# Patient Record
(demographics unavailable — no encounter records)

---

## 2025-03-13 NOTE — VITALS
[60: Requires occasional assistance, but is able to care for most of his/her needs] : 60: Requires occasional assistance, but is able to care for most of his/her needs [ECOG Performance Status: 2 - Ambulatory and capable of all self care but unable to carry out any work activities] : Performance Status: 2 - Ambulatory and capable of all self care but unable to carry out any work activities. Up and about more than 50% of waking hours

## 2025-04-02 NOTE — PHYSICAL EXAM
[General Appearance - Alert] : alert [General Appearance - In No Acute Distress] : in no acute distress [Sclera] : the sclera and conjunctiva were normal [Extraocular Movements] : extraocular movements were intact [Outer Ear] : the ears and nose were normal in appearance [Hearing Threshold Finger Rub Not [place]] : hearing was normal [Examination Of The Oral Cavity] : the lips and gums were normal [] : no respiratory distress [Exaggerated Use Of Accessory Muscles For Inspiration] : no accessory muscle use [Skin Color & Pigmentation] : normal skin color and pigmentation [No Focal Deficits] : no focal deficits [Oriented To Time, Place, And Person] : oriented to person, place, and time [de-identified] : macroglossia, slightly deviated tongue

## 2025-04-02 NOTE — REVIEW OF SYSTEMS
[Nausea: Grade 0] : Nausea: Grade 0 [Vomiting: Grade 0] : Vomiting: Grade 0 [Blurred Vision: Grade 1 - Intervention not indicated] : Blurred Vision: Grade 1 - Intervention not indicated [Cognitive Disturbance: Grade 2 - Moderate cognitive disability; interfering with work/school/life performance but capable of independent living; specialized resources on part time basis indicated] : Cognitive Disturbance: Grade 2 - Moderate cognitive disability; interfering with work/school/life performance but capable of independent living; specialized resources on part time basis indicated [Dizziness: Grade 1 - Mild unsteadiness or sensation of movement] : Dizziness: Grade 1 - Mild unsteadiness or sensation of movement [Headache: Grade 1 - Mild pain] : Headache: Grade 1 - Mild pain [Peripheral Motor Neuropathy: Grade 1 - Asymptomatic; clinical or diagnostic observations only; intervention not indicated] : Peripheral Motor Neuropathy: Grade 1 - Asymptomatic; clinical or diagnostic observations only; intervention not indicated [Peripheral Sensory Neuropathy: Grade 1 - Asymptomatic; loss of deep tendon reflexes or paresthesia] : Peripheral Sensory Neuropathy: Grade 1 - Asymptomatic; loss of deep tendon reflexes or paresthesia [Hoarseness: Grade 1 - Mild or intermittent voice change; fully understandable; self-resolves] : Hoarseness: Grade 1 - Mild or intermittent voice change; fully understandable; self-resolves [Voice Alteration: Grade 1 - Mild or intermittent change from normal voice] : Voice Alteration: Grade 1 - Mild or intermittent change from normal voice [Pruritus: Grade 0] : Pruritus: Grade 0 [Negative] : Musculoskeletal [FreeTextEntry3] : vision is worsening [de-identified] : memory worsening [de-identified] : both eyes

## 2025-04-02 NOTE — HISTORY OF PRESENT ILLNESS
[FreeTextEntry1] : Mrs. 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  Followed by Dr. Reina   3/18/2025- Mr. Ding presents today for follow up.  MRI 3/19/2025 showed: Stable right jugular fossa enhancing lesion compatible with known glomus jugulare. No evidence of acute ischemia or intracranial hemorrhage. Mild to moderate chronic white matter ischemic changes. Today she notes her memory is not as good as it used to be. Feels her vision is worsening and her tongue deviation is worsening.  She has been referred for Amyvid PET, for neuropsych testing, and to ENT.

## 2025-04-02 NOTE — PHYSICAL EXAM
[General Appearance - Alert] : alert [General Appearance - In No Acute Distress] : in no acute distress [Sclera] : the sclera and conjunctiva were normal [Extraocular Movements] : extraocular movements were intact [Outer Ear] : the ears and nose were normal in appearance [Hearing Threshold Finger Rub Not [place]] : hearing was normal [Examination Of The Oral Cavity] : the lips and gums were normal [] : no respiratory distress [Exaggerated Use Of Accessory Muscles For Inspiration] : no accessory muscle use [Skin Color & Pigmentation] : normal skin color and pigmentation [No Focal Deficits] : no focal deficits [Oriented To Time, Place, And Person] : oriented to person, place, and time [de-identified] : macroglossia, slightly deviated tongue

## 2025-04-02 NOTE — REVIEW OF SYSTEMS
[Nausea: Grade 0] : Nausea: Grade 0 [Vomiting: Grade 0] : Vomiting: Grade 0 [Blurred Vision: Grade 1 - Intervention not indicated] : Blurred Vision: Grade 1 - Intervention not indicated [Cognitive Disturbance: Grade 2 - Moderate cognitive disability; interfering with work/school/life performance but capable of independent living; specialized resources on part time basis indicated] : Cognitive Disturbance: Grade 2 - Moderate cognitive disability; interfering with work/school/life performance but capable of independent living; specialized resources on part time basis indicated [Dizziness: Grade 1 - Mild unsteadiness or sensation of movement] : Dizziness: Grade 1 - Mild unsteadiness or sensation of movement [Headache: Grade 1 - Mild pain] : Headache: Grade 1 - Mild pain [Peripheral Motor Neuropathy: Grade 1 - Asymptomatic; clinical or diagnostic observations only; intervention not indicated] : Peripheral Motor Neuropathy: Grade 1 - Asymptomatic; clinical or diagnostic observations only; intervention not indicated [Peripheral Sensory Neuropathy: Grade 1 - Asymptomatic; loss of deep tendon reflexes or paresthesia] : Peripheral Sensory Neuropathy: Grade 1 - Asymptomatic; loss of deep tendon reflexes or paresthesia [Hoarseness: Grade 1 - Mild or intermittent voice change; fully understandable; self-resolves] : Hoarseness: Grade 1 - Mild or intermittent voice change; fully understandable; self-resolves [Voice Alteration: Grade 1 - Mild or intermittent change from normal voice] : Voice Alteration: Grade 1 - Mild or intermittent change from normal voice [Pruritus: Grade 0] : Pruritus: Grade 0 [Negative] : Musculoskeletal [FreeTextEntry3] : vision is worsening [de-identified] : memory worsening [de-identified] : both eyes

## 2025-04-02 NOTE — PHYSICAL EXAM
[General Appearance - Alert] : alert [General Appearance - In No Acute Distress] : in no acute distress [Sclera] : the sclera and conjunctiva were normal [Extraocular Movements] : extraocular movements were intact [Outer Ear] : the ears and nose were normal in appearance [Hearing Threshold Finger Rub Not [place]] : hearing was normal [Examination Of The Oral Cavity] : the lips and gums were normal [] : no respiratory distress [Exaggerated Use Of Accessory Muscles For Inspiration] : no accessory muscle use [Skin Color & Pigmentation] : normal skin color and pigmentation [No Focal Deficits] : no focal deficits [Oriented To Time, Place, And Person] : oriented to person, place, and time [de-identified] : macroglossia, slightly deviated tongue

## 2025-04-02 NOTE — REVIEW OF SYSTEMS
[Nausea: Grade 0] : Nausea: Grade 0 [Vomiting: Grade 0] : Vomiting: Grade 0 [Blurred Vision: Grade 1 - Intervention not indicated] : Blurred Vision: Grade 1 - Intervention not indicated [Cognitive Disturbance: Grade 2 - Moderate cognitive disability; interfering with work/school/life performance but capable of independent living; specialized resources on part time basis indicated] : Cognitive Disturbance: Grade 2 - Moderate cognitive disability; interfering with work/school/life performance but capable of independent living; specialized resources on part time basis indicated [Dizziness: Grade 1 - Mild unsteadiness or sensation of movement] : Dizziness: Grade 1 - Mild unsteadiness or sensation of movement [Headache: Grade 1 - Mild pain] : Headache: Grade 1 - Mild pain [Peripheral Motor Neuropathy: Grade 1 - Asymptomatic; clinical or diagnostic observations only; intervention not indicated] : Peripheral Motor Neuropathy: Grade 1 - Asymptomatic; clinical or diagnostic observations only; intervention not indicated [Peripheral Sensory Neuropathy: Grade 1 - Asymptomatic; loss of deep tendon reflexes or paresthesia] : Peripheral Sensory Neuropathy: Grade 1 - Asymptomatic; loss of deep tendon reflexes or paresthesia [Hoarseness: Grade 1 - Mild or intermittent voice change; fully understandable; self-resolves] : Hoarseness: Grade 1 - Mild or intermittent voice change; fully understandable; self-resolves [Voice Alteration: Grade 1 - Mild or intermittent change from normal voice] : Voice Alteration: Grade 1 - Mild or intermittent change from normal voice [Pruritus: Grade 0] : Pruritus: Grade 0 [Negative] : Musculoskeletal [FreeTextEntry3] : vision is worsening [de-identified] : memory worsening [de-identified] : both eyes

## 2025-04-02 NOTE — DISEASE MANAGEMENT
[Clinical] : TNM Stage: c [N/A] : Currently not applicable [FreeTextEntry4] : right jugular glomus tumor [TTNM] : x [NTNM] : x [MTNM] : x

## 2025-04-02 NOTE — HISTORY OF PRESENT ILLNESS
[FreeTextEntry1] : Mrs. 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  Followed by Dr. Reina   3/18/2025- Mr. Ding presents today for follow up.  MRI 3/19/2025 showed: Stable right jugular fossa enhancing lesion compatible with known glomus jugulare. No evidence of acute ischemia or intracranial hemorrhage. Mild to moderate chronic white matter ischemic changes. Today she notes her memory is not as good as it used to be. Feels her vision is worsening and her tongue deviation is worsening.  She has been referred for Amyvid PET, for neuropsych testing, and to ENT.